# Patient Record
Sex: FEMALE | Race: BLACK OR AFRICAN AMERICAN | NOT HISPANIC OR LATINO | Employment: UNEMPLOYED | URBAN - METROPOLITAN AREA
[De-identification: names, ages, dates, MRNs, and addresses within clinical notes are randomized per-mention and may not be internally consistent; named-entity substitution may affect disease eponyms.]

---

## 2017-01-24 ENCOUNTER — OFFICE VISIT (OUTPATIENT)
Dept: OBSTETRICS AND GYNECOLOGY | Facility: CLINIC | Age: 37
End: 2017-01-24
Payer: COMMERCIAL

## 2017-01-24 ENCOUNTER — CLINICAL SUPPORT (OUTPATIENT)
Dept: OBSTETRICS AND GYNECOLOGY | Facility: CLINIC | Age: 37
End: 2017-01-24
Payer: COMMERCIAL

## 2017-01-24 ENCOUNTER — LAB VISIT (OUTPATIENT)
Dept: LAB | Facility: HOSPITAL | Age: 37
End: 2017-01-24
Attending: OBSTETRICS & GYNECOLOGY
Payer: COMMERCIAL

## 2017-01-24 VITALS
RESPIRATION RATE: 13 BRPM | WEIGHT: 133.63 LBS | HEART RATE: 70 BPM | SYSTOLIC BLOOD PRESSURE: 100 MMHG | BODY MASS INDEX: 21.47 KG/M2 | DIASTOLIC BLOOD PRESSURE: 64 MMHG | HEIGHT: 66 IN

## 2017-01-24 DIAGNOSIS — N92.1 MENOMETRORRHAGIA: ICD-10-CM

## 2017-01-24 DIAGNOSIS — N92.1 MENOMETRORRHAGIA: Primary | ICD-10-CM

## 2017-01-24 DIAGNOSIS — E10.9 TYPE 1 DIABETES MELLITUS WITHOUT COMPLICATION: ICD-10-CM

## 2017-01-24 DIAGNOSIS — Z30.013 INITIATION OF DEPO PROVERA: Primary | ICD-10-CM

## 2017-01-24 LAB
B-HCG UR QL: NEGATIVE
BASOPHILS # BLD AUTO: 0.02 K/UL
BASOPHILS NFR BLD: 0.2 %
CTP QC/QA: YES
DIFFERENTIAL METHOD: ABNORMAL
EOSINOPHIL # BLD AUTO: 0.1 K/UL
EOSINOPHIL NFR BLD: 0.8 %
ERYTHROCYTE [DISTWIDTH] IN BLOOD BY AUTOMATED COUNT: 13.6 %
HCT VFR BLD AUTO: 33 %
HGB BLD-MCNC: 10.8 G/DL
LYMPHOCYTES # BLD AUTO: 2.5 K/UL
LYMPHOCYTES NFR BLD: 29.8 %
MCH RBC QN AUTO: 26.8 PG
MCHC RBC AUTO-ENTMCNC: 32.7 %
MCV RBC AUTO: 82 FL
MONOCYTES # BLD AUTO: 0.5 K/UL
MONOCYTES NFR BLD: 6.2 %
NEUTROPHILS # BLD AUTO: 5.2 K/UL
NEUTROPHILS NFR BLD: 63 %
PLATELET # BLD AUTO: 402 K/UL
PMV BLD AUTO: 10.9 FL
RBC # BLD AUTO: 4.03 M/UL
TSH SERPL DL<=0.005 MIU/L-ACNC: 1.27 UIU/ML
WBC # BLD AUTO: 8.26 K/UL

## 2017-01-24 PROCEDURE — 36415 COLL VENOUS BLD VENIPUNCTURE: CPT

## 2017-01-24 PROCEDURE — 85025 COMPLETE CBC W/AUTO DIFF WBC: CPT

## 2017-01-24 PROCEDURE — 99999 PR PBB SHADOW E&M-EST. PATIENT-LVL III: CPT | Mod: PBBFAC,,, | Performed by: OBSTETRICS & GYNECOLOGY

## 2017-01-24 PROCEDURE — 99203 OFFICE O/P NEW LOW 30 MIN: CPT | Mod: 25,S$GLB,, | Performed by: OBSTETRICS & GYNECOLOGY

## 2017-01-24 PROCEDURE — 84443 ASSAY THYROID STIM HORMONE: CPT

## 2017-01-24 RX ORDER — BLOOD SUGAR DIAGNOSTIC
STRIP MISCELLANEOUS
Refills: 4 | COMMUNITY
Start: 2016-12-08

## 2017-01-24 RX ORDER — MEDROXYPROGESTERONE ACETATE 150 MG/ML
150 INJECTION, SUSPENSION INTRAMUSCULAR
Status: SHIPPED | OUTPATIENT
Start: 2017-01-24 | End: 2018-01-19

## 2017-01-24 RX ORDER — NAPROXEN SODIUM 220 MG
TABLET ORAL
Refills: 1 | COMMUNITY
Start: 2016-12-23

## 2017-01-24 RX ORDER — INSULIN GLARGINE 100 [IU]/ML
INJECTION, SOLUTION SUBCUTANEOUS
Refills: 4 | COMMUNITY
Start: 2016-11-14

## 2017-01-24 RX ORDER — HUMAN INSULIN 100 [IU]/ML
INJECTION, SOLUTION SUBCUTANEOUS
Refills: 4 | COMMUNITY
Start: 2017-01-03

## 2017-01-24 RX ORDER — MEDROXYPROGESTERONE ACETATE 150 MG/ML
150 INJECTION, SUSPENSION INTRAMUSCULAR
Qty: 1 ML | Refills: 3 | Status: SHIPPED | OUTPATIENT
Start: 2017-01-24 | End: 2018-01-24

## 2017-01-24 RX ORDER — SYRINGE,SAFETY WITH NEEDLE,1ML 25GX1"
SYRINGE (EA) MISCELLANEOUS
Refills: 5 | COMMUNITY
Start: 2016-11-03

## 2017-01-24 RX ADMIN — MEDROXYPROGESTERONE ACETATE 150 MG: 150 INJECTION, SUSPENSION INTRAMUSCULAR at 12:01

## 2017-01-24 NOTE — PROGRESS NOTES
Subjective:    Patient ID: Sanjiv Randall is a 36 y.o. y.o. female.     Chief Complaint:   Chief Complaint   Patient presents with    Menstrual Problem     pt states she has been having irregular bleeding for years now       History of Present Illness   Sanjiv presents today complaining of abnormal vaginal bleeding. She states that her irregular bleeding began a few years ago and has been continuous. Patient is a poor historian when it comes to her menstrual cycle.  She states that the amount of bleeding has been heavy and every day for the past 3-4 years.   She also states that she is not bleeding currently.  Patient's sister reports that patient does have heavy cycles that last longer than normal. Patient previously seen in [place] and was to start DepoProvera, but she moved.  She has not received any treatment for her heavy and irregular cycles.  Patient would like to start DepoProvera.    ROS:   Review of Systems   Constitutional: Negative for chills, diaphoresis, fatigue, fever and unexpected weight change.   HENT: Negative for congestion, hearing loss, rhinorrhea and sore throat.    Eyes: Negative for pain, discharge and visual disturbance.   Respiratory: Negative for apnea, cough, shortness of breath and wheezing.    Cardiovascular: Negative for chest pain, palpitations and leg swelling.   Gastrointestinal: Negative for abdominal pain, constipation, diarrhea, nausea and vomiting.   Endocrine: Negative for cold intolerance and heat intolerance.   Genitourinary: Positive for menstrual problem. Negative for difficulty urinating, dyspareunia, dysuria, flank pain, frequency, genital sores, hematuria, pelvic pain, vaginal bleeding, vaginal discharge and vaginal pain.   Musculoskeletal: Negative for arthralgias, back pain and joint swelling.   Skin: Negative for rash.   Neurological: Negative for dizziness, weakness, light-headedness, numbness and headaches.   Psychiatric/Behavioral: Negative for agitation and  confusion. The patient is not nervous/anxious.           Objective:    Vital Signs:  Vitals:    01/24/17 1123   BP: 100/64   Pulse: 70   Resp: 13       Physical Exam:  Physical Exam   Constitutional: She is oriented to person, place, and time. She appears well-developed and well-nourished. No distress.   HENT:   Head: Normocephalic and atraumatic.   Eyes: Conjunctivae are normal. No scleral icterus.   Neck: Normal range of motion. Neck supple.   Pulmonary/Chest: Effort normal.   Abdominal: Soft. She exhibits no distension and no mass. There is no tenderness. There is no rebound and no guarding.   Genitourinary: There is no tenderness or lesion on the right labia. There is no tenderness or lesion on the left labia. Uterus is not enlarged and not tender. Cervix exhibits no motion tenderness, no discharge and no friability. Right adnexum displays no tenderness and no fullness. Left adnexum displays no tenderness and no fullness. No bleeding in the vagina. No signs of injury around the vagina. No vaginal discharge found.   Musculoskeletal: Normal range of motion.   Neurological: She is alert and oriented to person, place, and time.   Skin: Skin is warm and dry. No rash noted.       Assessment:      1. Menometrorrhagia          Plan:      Menometrorrhagia  -     CBC auto differential; Future; Expected date: 1/24/17  -     TSH; Future; Expected date: 1/24/17    Other orders  -     medroxyPROGESTERone (DEPO-PROVERA) 150 mg/mL injection; Inject 1 mL (150 mg total) into the muscle every 3 (three) months.  Dispense: 1 mL; Refill: 3      Will start DepoProvera and monitor for cycle regulation.

## 2017-01-24 NOTE — PROGRESS NOTES
UPT negative. Depo Provera injection given RUOQ per order. Patient instructed to wait 20 minutes after injection administration. Patient verbalized understanding. Patient supplied medication from local pharmacy. Patientt instructed to  prescription the day of injection administration. Depo Provera medication must be stored at room temperature between 68-77 Degrees Fahrenheit in upright position. Keep away from moisture and heat. Do not tamper with vial. Do not self administer medication. Pt verbalized understanding.

## 2017-01-24 NOTE — MR AVS SNAPSHOT
[place] [facility]  [address] [facility] [address]  Phone: [phone]                  Sanjiv Randall   2017 11:30 AM   Office Visit    Description:  Female : 1980   Provider:  Martin Garber MD   Department:  [place] [facility]           Reason for Visit     Menstrual Problem           Diagnoses this Visit        Comments    Menometrorrhagia    -  Primary            To Do List           Goals (5 Years of Data)     None      Follow-Up and Disposition     Return in about 3 months (around 2017) for 2nd DepoProvera shot.       These Medications        Disp Refills Start End    medroxyPROGESTERone (DEPO-PROVERA) 150 mg/mL injection 1 mL 3 2017    Inject 1 mL (150 mg total) into the muscle every 3 (three) months. - Intramuscular    Pharmacy: [facility] [address]  Ph #: [phone]         [facility]     [facility] Nurse Care Line -  Assistance  Registered nurses in the [facility] provide clinical advisement, health education, appointment booking, and other advisory services.  Call for this free service at [phone].             Medications           START taking these NEW medications        Refills    medroxyPROGESTERone (DEPO-PROVERA) 150 mg/mL injection 3    Sig: Inject 1 mL (150 mg total) into the muscle every 3 (three) months.    Class: Normal    Route: Intramuscular           Verify that the below list of medications is an accurate representation of the medications you are currently taking.  If none reported, the list may be blank. If incorrect, please contact your healthcare provider. Carry this list with you in case of emergency.           Current Medications     insulin syringe-needle U-100 0.5 mL 31 gauge x 5/16 Syrg USE TO CHECK QID    insulin syringe-needle U-100 1 mL 31 gauge x 5/16 Syrg USE 4 TIMES A DAY AS DIRECTED    LANTUS 100 unit/mL injection INJ 15 UNITS  "SC QHS    NOVOLIN R 100 unit/mL injection INJECT 15 UNITS UNDER SKIN AC    ONETOUCH ULTRA TEST Strp TEST BLOOD SUGAR 4 TIMES A DAY    medroxyPROGESTERone (DEPO-PROVERA) 150 mg/mL injection Inject 1 mL (150 mg total) into the muscle every 3 (three) months.           Clinical Reference Information           Vital Signs - Last Recorded  Most recent update: 1/24/2017 11:24 AM by Manda Chowdhury    BP Pulse Resp Ht Wt BMI    100/64 70 13 5' 6" (1.676 m) 60.6 kg (133 lb 9.6 oz) 21.56 kg/m2      Blood Pressure          Most Recent Value    BP  100/64      Allergies as of 1/24/2017     No Known Allergies      Immunizations Administered on Date of Encounter - 1/24/2017     None      Orders Placed During Today's Visit     Future Labs/Procedures Expected by Expires    CBC auto differential  1/24/2017 3/25/2018    TSH  1/24/2017 4/24/2017      "

## 2017-03-20 ENCOUNTER — TELEPHONE (OUTPATIENT)
Dept: OBSTETRICS AND GYNECOLOGY | Facility: CLINIC | Age: 37
End: 2017-03-20

## 2017-03-20 NOTE — TELEPHONE ENCOUNTER
Pt notified of new Depo Provera Process. Rx will now be sent to and administered at [facility]. As long as pt is on time for injection, does not need to be seen in clinic expect for annual exam/problems. If late for injection, must report to clinic first for UPT, then proceed to pharmacy for injection. Pt verbalized understanding. Has no questions/concerns.